# Patient Record
Sex: FEMALE | Race: WHITE | NOT HISPANIC OR LATINO | Employment: UNEMPLOYED | URBAN - METROPOLITAN AREA
[De-identification: names, ages, dates, MRNs, and addresses within clinical notes are randomized per-mention and may not be internally consistent; named-entity substitution may affect disease eponyms.]

---

## 2022-11-09 ENCOUNTER — APPOINTMENT (OUTPATIENT)
Dept: URGENT CARE | Facility: CLINIC | Age: 27
End: 2022-11-09

## 2022-11-09 DIAGNOSIS — Z02.1 PRE-EMPLOYMENT HEALTH SCREENING EXAMINATION: Primary | ICD-10-CM

## 2022-11-09 DIAGNOSIS — Z02.1 PRE-EMPLOYMENT HEALTH SCREENING EXAMINATION: ICD-10-CM

## 2022-11-11 LAB
GAMMA INTERFERON BACKGROUND BLD IA-ACNC: 0.03 IU/ML
M TB IFN-G BLD-IMP: NEGATIVE
M TB IFN-G CD4+ BCKGRND COR BLD-ACNC: 0 IU/ML
M TB IFN-G CD4+ BCKGRND COR BLD-ACNC: 0.01 IU/ML
MITOGEN IGNF BCKGRD COR BLD-ACNC: >10 IU/ML

## 2022-12-12 ENCOUNTER — APPOINTMENT (OUTPATIENT)
Dept: URGENT CARE | Facility: CLINIC | Age: 27
End: 2022-12-12

## 2022-12-12 ENCOUNTER — APPOINTMENT (OUTPATIENT)
Dept: LAB | Facility: CLINIC | Age: 27
End: 2022-12-12

## 2022-12-12 DIAGNOSIS — Z02.1 PRE-EMPLOYMENT EXAMINATION: ICD-10-CM

## 2022-12-12 LAB — RUBV IGG SERPL IA-ACNC: >175 IU/ML

## 2022-12-13 LAB
GAMMA INTERFERON BACKGROUND BLD IA-ACNC: 0.03 IU/ML
M TB IFN-G BLD-IMP: NEGATIVE
M TB IFN-G CD4+ BCKGRND COR BLD-ACNC: 0 IU/ML
M TB IFN-G CD4+ BCKGRND COR BLD-ACNC: 0.01 IU/ML
MEV IGG SER QL IA: NORMAL
MITOGEN IGNF BCKGRD COR BLD-ACNC: >10 IU/ML
MUV IGG SER QL IA: NORMAL
VZV IGG SER QL IA: NORMAL

## 2023-07-06 ENCOUNTER — TELEPHONE (OUTPATIENT)
Dept: NEUROLOGY | Facility: CLINIC | Age: 28
End: 2023-07-06

## 2023-07-06 NOTE — TELEPHONE ENCOUNTER
OM for the patient to call us back to schedule a NP appt with Dr. Shiela Barrera per his request. If the patient calls back please schedule with him directly on either July 18th at 2pm or July 20th at 8am if either time is available. If the patient can not see him during those times we can also offer to set up an appt on July 14th at the Neurosurg office per Shiela Barrera.

## 2023-07-06 NOTE — TELEPHONE ENCOUNTER
Spoke to the patient and scheduled a tentative date and time of 7/14 at [address] [facility] in [address] [place] [address] with Kalie Rodriguez. Spencer Youssef is aware. Awaiting response from Kalie Rodriguez to ensure availability.

## 2023-07-14 ENCOUNTER — TELEPHONE (OUTPATIENT)
Dept: NEUROSURGERY | Facility: CLINIC | Age: 28
End: 2023-07-14

## 2023-07-14 ENCOUNTER — CONSULT (OUTPATIENT)
Dept: NEUROLOGY | Facility: CLINIC | Age: 28
End: 2023-07-14
Payer: COMMERCIAL

## 2023-07-14 VITALS
TEMPERATURE: 98.9 F | BODY MASS INDEX: 31.41 KG/M2 | WEIGHT: 177.3 LBS | RESPIRATION RATE: 18 BRPM | DIASTOLIC BLOOD PRESSURE: 64 MMHG | HEART RATE: 80 BPM | OXYGEN SATURATION: 98 % | SYSTOLIC BLOOD PRESSURE: 110 MMHG | HEIGHT: 63 IN

## 2023-07-14 DIAGNOSIS — G43.709 CHRONIC MIGRAINE WITHOUT AURA: Primary | ICD-10-CM

## 2023-07-14 PROBLEM — G43.009 MIGRAINE WITHOUT AURA AND WITHOUT STATUS MIGRAINOSUS, NOT INTRACTABLE: Status: ACTIVE | Noted: 2023-07-14

## 2023-07-14 PROCEDURE — 99204 OFFICE O/P NEW MOD 45 MIN: CPT | Performed by: PSYCHIATRY & NEUROLOGY

## 2023-07-14 RX ORDER — RIMEGEPANT SULFATE 75 MG/75MG
TABLET, ORALLY DISINTEGRATING ORAL
Qty: 8 TABLET | Refills: 2 | Status: SHIPPED | OUTPATIENT
Start: 2023-07-14

## 2023-07-14 RX ORDER — NORETHINDRONE ACETATE AND ETHINYL ESTRADIOL AND FERROUS FUMARATE 1.5-30(21)
1 KIT ORAL DAILY
COMMUNITY
Start: 2023-06-15

## 2023-07-14 RX ORDER — INDOMETHACIN 25 MG/1
CAPSULE ORAL
COMMUNITY

## 2023-07-14 RX ORDER — ELETRIPTAN HYDROBROMIDE 40 MG/1
TABLET, FILM COATED ORAL EVERY 24 HOURS
COMMUNITY
End: 2023-07-14

## 2023-07-14 RX ORDER — CYPROHEPTADINE HYDROCHLORIDE 4 MG/1
TABLET ORAL
Qty: 60 TABLET | Refills: 3 | Status: SHIPPED | OUTPATIENT
Start: 2023-07-14

## 2023-07-14 RX ORDER — METOCLOPRAMIDE 10 MG/1
TABLET ORAL
Qty: 20 TABLET | Refills: 3 | Status: SHIPPED | OUTPATIENT
Start: 2023-07-14

## 2023-07-14 NOTE — PROGRESS NOTES
Assessment/Plan:   Patient Instructions   Chronic migraine: Nataly Cohn presents for an initial consultation with regard to what qualifies as chronic migraine without aura. She is also experiencing episodes of vertigo. Her examination in the office today is quite reassuring. I did review her prior MRI from 2012 which was quite normal.  -As an initial attempt at prevention we will begin cyproheptadine 4 mg tablets. She should take 1 tablet at bedtime (whether bedtime is day or night depending on her schedule) and if she notices no benefit and no side effects at all after 2 weeks she could increase to 2 tablets at a time  -If she notices that she is too tired when she tries to wake up she should take the cyproheptadine earlier before bedtime  -To treat a migraine as it occurs we will begin Nurtec 75 mg tablets. She should take 1 tablet as early as possible to treat the migraine. That could be combined with an NSAID (either 1 tablet of indomethacin or 3 tablets of over-the-counter ibuprofen), 1 tablet of Reglan, or both  -I would like for her to keep track of her migraines using an application on her phone  -She should get adequate rest whenever possible. It is important that she remain well-hydrated drinking at least 48 to 64 ounces of noncaffeinated beverages per day in addition to any caffeine. She should also continue to get regular exercise whenever possible. All of these measures can help to reduce migraine frequency  -If her vertigo episodes do not significantly improve/resolve with better control of her migraines then we will consider additional testing  -Up to this point she has used 1 dose of indomethacin prior to significant physical exercise and has not had any exercise triggered migraines. Is not unreasonable to continue that practice but at this point in time I am not 100% sure that the indomethacin is necessarily helping.   Once the migraines are under good control consideration could be given to a trial of holding the indomethacin  -Depending on the results of cyproheptadine we could consider long-acting topiramate as our next option    She should reach out to me in no more than 2 weeks to report on her progress and I will see her directly back in the office in 3 months time. No problem-specific Assessment & Plan notes found for this encounter. Subjective:     Kristofer West is a 33 y.o. Female who presents for evaluation of headache. Temporal Pattern of Headaches: They started having HA's  ago at 14 y/o in 2012    Additional Relevant History:  History of severe head/neck trauma? yes - concussions, no LOC  History of head/neck surgery? no  Do you have any family history of headache? migraine headaches in mother  Family history of aneurysms? no  Are you now or currently planning to become pregnant? No, on OCP       Is there any particular time of day that is worse: morning if weather related, at work latera in the day  Do they awaken from sleep?: no  Is there any seasonal pattern?: fall and spring are worse  Any relationship to menstrual cycle: no  'Clustering' of HA's over time? no  Have you found any triggers? Weather, hospital lights, stress, jaw clenching at night, takes Indocin befiore running and not getting migraines  Exacerbating factors if any: laying in a dark room with head elevated a little    Description of Headaches:  Do you have a specific aura? No  Are there any other prodromal sx?: None    What is the typical location of pain: bilateral superior frontal and temporal areas, R>L, also perioribital pain on the right    What is the character of pain: Pounding    How severe is the pain for a typical headache? 7  What is the maximum severity of pain?  9    Accompanying symptoms:nausea, vomiting, photophobia, some lacrimation,     Rapidity of onset: gradual but starts at a 5    Typical duration of individual headache:  Approx 36 hours    Frequency of headaches 15 migraine days per month, more than 3 months      Are most headaches similar in presentation? yes        Therapeutics:  Preventives: Topamax (side effects but helped), Mg/B2 not helpful    Abortives: Sumatriptan (tabs/injection, injections was quite painful, helped), Maxalt (no help), Relpax (makes her feel worse), Indocin (prior to activity, also uses as an abortives and it helps a little)      Dizziness with bending down and getting up quickly, notes that when she wakes up in the morning if she feels more dizzy she will be more sensitive that day. Feels both light headed and room spinning. Such bad days are happening 4 days per month. At this point suspect may be migraine sensitivity phenominon but will determine change with migraine treatment. Review of Systems    Review of Systems   Constitutional: Negative. HENT: Negative. Eyes: Positive for pain (RIGHT EYE AND TEMPORAL PAIN WITH HEADACHES STABBING). Respiratory: Negative. Cardiovascular: Negative. Gastrointestinal: Negative. Endocrine: Negative. Genitourinary: Negative. Musculoskeletal: Negative for gait problem. Skin: Negative. Neurological: Positive for dizziness (OCASSIONALLY FEELS LIKE A HEAD RUSH DOESNT LAST LONG), light-headedness (OCASSIONALY WITH AND WITHOUT HEADACHES) and headaches (ABOUT 15 DAYS OUT OF THE MONTH  LAST A FEW DAYS  CONSTANT  PRESSURE WHOLE HEAD AND STABBING PAIN IN RIGHT EYE TEMPORAL AREA). Negative for tremors, seizures, syncope (WHEN DIZZY OR LIGHTHEADED FEELS AS IF SHE WILL PASS OUT), speech difficulty, weakness and numbness. Hematological: Does not bruise/bleed easily. Psychiatric/Behavioral: Negative for confusion and sleep disturbance.      Objective:      /64 (BP Location: Right arm, Patient Position: Sitting, Cuff Size: Standard)   Pulse 80   Temp 98.9 °F (37.2 °C) (Temporal)   Resp 18   Ht 5' 3" (1.6 m)   Wt 80.4 kg (177 lb 4.8 oz)   SpO2 98%   BMI 31.41 kg/m²          Physical Exam      At the time my examination the patient was awake, alert, and in no distress. They are an excellent historian. There is no dysarthria or aphasia. Cranial nerves 2-12 were symmetrically intact bilaterally. Funduscopic exam did not reveal any papilledema. Motor testing reveals 5/5 strength throughout the bilateral upper lower extremities. There is no drift. Finger-to-nose with eyes closed is intact indicating relatively preserved proprioceptive function. With eyes open there is no dysmetria or ataxia. Sensation is intact to temperature and vibration throughout the bilateral upper lower extremities. Deep tendon reflexes were symmetric. Their gait was stable. HINTS exam was essentially normal      I have spent a total time of 48 minutes on 7/14/23 in caring for this patient including Diagnostic results, Prognosis, Risks and benefits of tx options, Instructions for management, Patient and family education, Impressions, Counseling / Coordination of care, Documenting in the medical record, Reviewing / ordering tests, medicine, procedures   and Obtaining or reviewing history  .

## 2023-07-14 NOTE — PATIENT INSTRUCTIONS
Chronic migraine: Dax Case presents for an initial consultation with regard to what qualifies as chronic migraine without aura. She is also experiencing episodes of vertigo. Her examination in the office today is quite reassuring. I did review her prior MRI from 2012 which was quite normal.  -As an initial attempt at prevention we will begin cyproheptadine 4 mg tablets. She should take 1 tablet at bedtime (whether bedtime is day or night depending on her schedule) and if she notices no benefit and no side effects at all after 2 weeks she could increase to 2 tablets at a time  -If she notices that she is too tired when she tries to wake up she should take the cyproheptadine earlier before bedtime  -To treat a migraine as it occurs we will begin Nurtec 75 mg tablets. She should take 1 tablet as early as possible to treat the migraine. That could be combined with an NSAID (either 1 tablet of indomethacin or 3 tablets of over-the-counter ibuprofen), 1 tablet of Reglan, or both  -I would like for her to keep track of her migraines using an application on her phone  -She should get adequate rest whenever possible. It is important that she remain well-hydrated drinking at least 48 to 64 ounces of noncaffeinated beverages per day in addition to any caffeine. She should also continue to get regular exercise whenever possible. All of these measures can help to reduce migraine frequency  -If her vertigo episodes do not significantly improve/resolve with better control of her migraines then we will consider additional testing  -Up to this point she has used 1 dose of indomethacin prior to significant physical exercise and has not had any exercise triggered migraines. Is not unreasonable to continue that practice but at this point in time I am not 100% sure that the indomethacin is necessarily helping.   Once the migraines are under good control consideration could be given to a trial of holding the indomethacin  -Depending on the results of cyproheptadine we could consider long-acting topiramate as our next option    She should reach out to me in no more than 2 weeks to report on her progress and I will see her directly back in the office in 3 months time.

## 2023-08-09 ENCOUNTER — DOCUMENTATION (OUTPATIENT)
Dept: OTHER | Facility: HOSPITAL | Age: 28
End: 2023-08-09

## 2023-08-09 DIAGNOSIS — G43.009 MIGRAINE WITHOUT AURA AND WITHOUT STATUS MIGRAINOSUS, NOT INTRACTABLE: Primary | ICD-10-CM

## 2023-08-09 DIAGNOSIS — G43.709 CHRONIC MIGRAINE WITHOUT AURA: ICD-10-CM

## 2023-08-09 RX ORDER — CYPROHEPTADINE HYDROCHLORIDE 4 MG/1
TABLET ORAL
Qty: 180 TABLET | Refills: 2 | Status: SHIPPED | OUTPATIENT
Start: 2023-08-09 | End: 2023-08-09

## 2023-08-09 RX ORDER — TOPIRAMATE 25 MG/1
CAPSULE, EXTENDED RELEASE ORAL
Qty: 60 CAPSULE | Refills: 1 | Status: SHIPPED | OUTPATIENT
Start: 2023-08-09

## 2023-08-17 ENCOUNTER — APPOINTMENT (OUTPATIENT)
Dept: LAB | Facility: HOSPITAL | Age: 28
End: 2023-08-17

## 2023-08-17 DIAGNOSIS — Z00.8 HEALTH EXAMINATION IN POPULATION SURVEY: ICD-10-CM

## 2023-08-17 LAB
CHOLEST SERPL-MCNC: 177 MG/DL
HDLC SERPL-MCNC: 50 MG/DL
LDLC SERPL CALC-MCNC: 114 MG/DL (ref 0–100)
NONHDLC SERPL-MCNC: 127 MG/DL
TRIGL SERPL-MCNC: 67 MG/DL

## 2023-08-17 PROCEDURE — 36415 COLL VENOUS BLD VENIPUNCTURE: CPT

## 2023-08-17 PROCEDURE — 83036 HEMOGLOBIN GLYCOSYLATED A1C: CPT

## 2023-08-17 PROCEDURE — 80061 LIPID PANEL: CPT

## 2023-08-18 LAB
EST. AVERAGE GLUCOSE BLD GHB EST-MCNC: 105 MG/DL
HBA1C MFR BLD: 5.3 %

## 2023-09-09 ENCOUNTER — APPOINTMENT (OUTPATIENT)
Dept: RADIOLOGY | Facility: CLINIC | Age: 28
End: 2023-09-09
Payer: COMMERCIAL

## 2023-09-09 ENCOUNTER — OFFICE VISIT (OUTPATIENT)
Dept: URGENT CARE | Facility: CLINIC | Age: 28
End: 2023-09-09
Payer: COMMERCIAL

## 2023-09-09 VITALS
OXYGEN SATURATION: 99 % | DIASTOLIC BLOOD PRESSURE: 79 MMHG | RESPIRATION RATE: 18 BRPM | TEMPERATURE: 97.5 F | SYSTOLIC BLOOD PRESSURE: 134 MMHG

## 2023-09-09 DIAGNOSIS — M25.532 LEFT WRIST PAIN: ICD-10-CM

## 2023-09-09 DIAGNOSIS — S63.502A WRIST SPRAIN, LEFT, INITIAL ENCOUNTER: Primary | ICD-10-CM

## 2023-09-09 PROCEDURE — 73110 X-RAY EXAM OF WRIST: CPT

## 2023-09-09 PROCEDURE — 99213 OFFICE O/P EST LOW 20 MIN: CPT

## 2023-09-09 NOTE — LETTER
September 9, 2023     Patient: Vela Ahumada   YOB: 1995   Date of Visit: 9/9/2023       To Whom it May Concern:    Vela Ahumada was seen in my clinic on 9/9/2023. She may return to work on 9/9/2023 . Please allow for lighter duty due to wrist sprain. If you have any questions or concerns, please don't hesitate to call.          Sincerely,          Normajean Bumpers, CRNP        CC: No Recipients

## 2023-09-09 NOTE — PROGRESS NOTES
[facility]        NAME: Kike Fuentes is a 33 y.o. female  : 1995    MRN: [id number]  DATE: 2023  TIME: 8:29 AM    Assessment and Plan   Wrist sprain, left, initial encounter [S63.502A]  1. Wrist sprain, left, initial encounter  Ambulatory Referral to Orthopedic Surgery      2. Left wrist pain  XR wrist 3+ vw left          Prefabricated wrist brace fit and placed by RN. Patient Instructions     Your x-rays were read by the provider. A radiologist will also read the x-rays and you will be notified of any abnormalities. Continue ice applications 86-01 min 2-3 times daily over the next 2-3 days. Continue Tylenol or Motrin for pain relief. Follow-up with ortho - referral placed. Follow-up with your PCP as needed. Go to the ED for any severely worsening symptoms. Chief Complaint     Chief Complaint   Patient presents with   • Left Wrist Pain      Pt fell yesterday on left wrist and has had pain since event. History of Present Illness       This is a 27-year-old female who presents with left wrist pain and swelling after falling onto tile floor yesterday with her wrist flexed. Patient states she heard a pop. Now feels a cracking sensation whenever she rotates her wrist.  Reports occasional numbness in her left hand, no tingling or weakness. Applying ice and taking Tylenol for pain relief. Review of Systems   Review of Systems   Constitutional: Negative for chills and fever. Respiratory: Negative for chest tightness and shortness of breath. Cardiovascular: Negative for chest pain and palpitations. Musculoskeletal: Positive for arthralgias (left wrist). Negative for myalgias. Skin: Negative for rash and wound. Neurological: Negative for dizziness, light-headedness and headaches.        Current Medications       Current Outpatient Medications:   •  Jayshree JAMES  1.5-30 MG-MCG tablet, Take 1 tablet by mouth daily, Disp: , Rfl:   • indomethacin (INDOCIN) 25 mg capsule, TAKE 1 CAPSULE PRIOR TO ACTIVITIES for 30, Disp: , Rfl:   •  metoclopramide (REGLAN) 10 mg tablet, 1 tab as needed at onset of migraine, can repeat in 8 hours, can combine with triptan/NSAID, Disp: 20 tablet, Rfl: 3  •  rimegepant sulfate (Nurtec) 75 mg TBDP, One tab as needed for migraine, max one per day, max 3 days per week, Disp: 8 tablet, Rfl: 2  •  topiramate ER (Qudexy XR) 25 MG capsule, 1 cap daily x 1-2 weeks, if no side effects increase to 2 caps, Disp: 60 capsule, Rfl: 1    Current Allergies     Allergies as of 09/09/2023 - Reviewed 09/09/2023   Allergen Reaction Noted   • Nuts - food allergy Hives 07/14/2023            The following portions of the patient's history were reviewed and updated as appropriate: allergies, current medications, past family history, past medical history, past social history, past surgical history and problem list.     Past Medical History:   Diagnosis Date   • Chronic headaches     FROM [facility]IVITY       Past Surgical History:   Procedure Laterality Date   • CYST REMOVAL     • KNEE SURGERY      LEFT   • NEUROPLASTY / TRANSPOSITION ULNAR NERVE AT ELBOW         Family History   Problem Relation Age of Onset   • Hypertension Mother    • Hypertension Father          Medications have been verified. Objective   /79   Temp 97.5 °F (36.4 °C) (Tympanic)   Resp 18   LMP 08/21/2023 (Approximate) Comment: Pt reports LMP was normal.  SpO2 99%        Physical Exam     Physical Exam  Vitals and nursing note reviewed. Constitutional:       General: She is not in acute distress. HENT:      Head: Normocephalic. Mouth/Throat:      Mouth: Mucous membranes are moist.   Cardiovascular:      Rate and Rhythm: Normal rate and regular rhythm. Pulses: Normal pulses. Heart sounds: Normal heart sounds. Pulmonary:      Effort: Pulmonary effort is normal.      Breath sounds: Normal breath sounds.    Musculoskeletal:         General: Normal range of motion. Left forearm: Normal.      Left wrist: Swelling (mild, no ecchymosis), tenderness and crepitus present. No snuff box tenderness. Normal pulse (+2 radial). Left hand: Normal. Normal strength. Normal sensation. Cervical back: Normal range of motion and neck supple. Comments: Pain with resisted flexion/extension. Skin:     General: Skin is warm and dry. Capillary Refill: Capillary refill takes less than 2 seconds. Neurological:      Mental Status: She is alert and oriented to person, place, and time.

## 2023-09-19 ENCOUNTER — OFFICE VISIT (OUTPATIENT)
Dept: OBGYN CLINIC | Facility: CLINIC | Age: 28
End: 2023-09-19
Payer: COMMERCIAL

## 2023-09-19 VITALS
HEART RATE: 80 BPM | DIASTOLIC BLOOD PRESSURE: 85 MMHG | SYSTOLIC BLOOD PRESSURE: 121 MMHG | BODY MASS INDEX: 31.71 KG/M2 | HEIGHT: 63 IN | WEIGHT: 179 LBS

## 2023-09-19 DIAGNOSIS — M25.532 LEFT WRIST PAIN: Primary | ICD-10-CM

## 2023-09-19 DIAGNOSIS — S63.502A WRIST SPRAIN, LEFT, INITIAL ENCOUNTER: ICD-10-CM

## 2023-09-19 DIAGNOSIS — M24.532 CONTRACTURE, LEFT WRIST: ICD-10-CM

## 2023-09-19 PROCEDURE — 99203 OFFICE O/P NEW LOW 30 MIN: CPT | Performed by: PHYSICIAN ASSISTANT

## 2023-09-19 NOTE — PATIENT INSTRUCTIONS
P. R.I.C.E. Treatment   WHAT YOU NEED TO KNOW:   What is P.R.I.C.E. treatment? P.R.I.C.E. treatment is a 5-step process used to decrease swelling and pain caused by an injury. P.R.I.C.E. stands for protect, rest, ice, compress, and elevate. Start P.R.I.C.E. within 24 to 48 hours of an injury. How do I use P.R.I.C.E. treatment? Protect  your injury from more damage. Support the injured area with a brace or splint. Your healthcare provider will tell you how long to use the brace or splint. Rest  your injured area as directed. You may need to stop using, or keep weight off, the injury for 48 hours or longer. Your healthcare provider may recommend crutches or another device. Return to your usual activities as directed. Apply ice  on your injured area for 15 to 20 minutes every 4 hours or as directed. Use an ice pack, or put crushed ice in a plastic bag. Cover the bag with a towel before you apply it to your skin. Ice helps prevent tissue damage and decreases swelling and pain. Compress  (keep pressure on) the injured area. Compression will help decrease swelling and support the injured area. Use an elastic bandage, air stirrup, splint, or sling as directed. If you use an elastic bandage, make sure the bandage is not too tight. You should be able to slip 2 fingers between the bandage and your skin. Elevate  the injured area above the level of your heart as often as you can. This will help decrease swelling and pain. Prop the injured area on pillows or blankets to keep it elevated comfortably. When should I seek immediate care? Your pain is severe. You have severe swelling or deformity. You have numbness in the injured area. When should I call my doctor? Your pain and swelling do not go away after a few days. You have questions or concerns about your condition or care. CARE AGREEMENT:   You have the right to help plan your care.  Learn about your health condition and how it may be treated. Discuss treatment options with your healthcare providers to decide what care you want to receive. You always have the right to refuse treatment. The above information is an  only. It is not intended as medical advice for individual conditions or treatments. Talk to your doctor, nurse or pharmacist before following any medical regimen to see if it is safe and effective for you. © Copyright Alexandra Friedman 2022 Information is for End User's use only and may not be sold, redistributed or otherwise used for commercial purposes. Wrist Sprain   WHAT YOU NEED TO KNOW:   What is a wrist sprain? A wrist sprain happens when one or more ligaments in your wrist stretch or tear. Ligaments are tough tissues that connect bones and keep them in place, and support your joints. What are the signs and symptoms of a wrist sprain? Swelling and tenderness    Pain and stiffness    Bruising or changes in skin color    Popping sound in your wrist when you move it    How is a wrist sprain diagnosed? Your healthcare provider will ask how you injured your wrist. The provider will examine your wrist and hand and ask about your symptoms. You may need x-rays, an MRI, or a CT scan of your wrist. You may be given contrast liquid to help the pictures show up better. Tell the healthcare provider if you have ever had an allergic reaction to contrast liquid. Do not enter the MRI room with anything metal. Metal can cause serious injury. Tell the healthcare provider if you have any metal in or on your body. How is a wrist sprain treated? Treatment depends on how severe your sprain is. You may need any of the following:  NSAIDs , such as ibuprofen, help decrease swelling, pain, and fever. NSAIDs can cause stomach bleeding or kidney problems in certain people. If you take blood thinner medicine, always ask your healthcare provider if NSAIDs are safe for you. Always read the medicine label and follow directions.     Acetaminophen decreases pain and fever. It is available without a doctor's order. Ask how much to take and how often to take it. Follow directions. Read the labels of all other medicines you are using to see if they also contain acetaminophen, or ask your doctor or pharmacist. Acetaminophen can cause liver damage if not taken correctly. A splint or cast  helps support your wrist and prevent more damage. Surgery  may be needed if you have a severe sprain. Arthroscopy may be done to examine the inside of your wrist joint and repair ligament damage. Arthroscopy uses a scope that is inserted through a small incision. You may need open surgery to reconnect torn ligaments to the bone. Physical therapy  may be recommended. A physical therapist teaches you exercises to help improve movement and strength, and to decrease pain. How can I manage my symptoms? Rest  your wrist for at least 48 hours. Avoid activities that cause pain. Ice  your wrist for 15 to 20 minutes every hour or as directed. Use an ice pack, or put crushed ice in a plastic bag. Cover it with a towel before you put it on your wrist. Ice helps prevent tissue damage and decreases swelling and pain. Compress  your wrist with an elastic bandage. This will help decrease swelling, support your wrist, and help it heal. Wear your wrist wrap as directed. The elastic bandage should be snug but not tight. Elevate  your wrist above the level of your heart as often as you can. This will help decrease swelling and pain. Prop your wrist on pillows or blankets to keep it elevated comfortably. When should I seek immediate care? You have severe pain or swelling. Your injured wrist is red or has red streaks spreading from the injured area. You have new trouble moving your hands, fingers, or wrist.    Your wrist, hand, or fingers feel cold or numb. Your fingernails turn blue or gray. When should I call my doctor?    Your symptoms get worse.    Your sprain does not get better within 2 weeks. You have questions or concerns about your condition or care. CARE AGREEMENT:   You have the right to help plan your care. Learn about your health condition and how it may be treated. Discuss treatment options with your healthcare providers to decide what care you want to receive. You always have the right to refuse treatment. The above information is an  only. It is not intended as medical advice for individual conditions or treatments. Talk to your doctor, nurse or pharmacist before following any medical regimen to see if it is safe and effective for you. © Copyright Sae Vega 2022 Information is for End User's use only and may not be sold, redistributed or otherwise used for commercial purposes.

## 2023-09-19 NOTE — PROGRESS NOTES
Orthopaedic Surgery - Office Note  Blue Cooley (52 y.o. female)   : 1995   MRN: [id number]  Encounter Date: 2023    No chief complaint on file. 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 Shared decision-making was utilized regarding neck steps. At this time due to the inability to supinate the wrist with moderate pain I would recommend an MRI to rule out pathology. In the interim she will be started in occupational therapy to begin range of motion and strengthening exercises. She may ice the wrist for comfort 20 minutes on 1 hour off 3 times a day. She may continue the brace for comfort and support. She may elevate the wrist above the level of the heart for edema control. Return for Recheck with hand surgeon to discuss MRI of left wrist.        History of Present Illness  This is a new patient who injured her left wrist on 2023 when she fell on a tile floor. Her wrist was flexed at the time and she reports her entire body weight fell onto the wrist.  She reported hearing a pop and noticed cracking sensations when she went to urgent care on 2023. She was given a brace at urgent care and advised to ice and utilize over-the-counter NSAIDs and Tylenol for pain. Patient reports the wrist did not swell and did not become ecchymotic. She reports continued dorsal wrist pain and the inability to supinate the wrist.  She states she has torn a tendon on her small finger in the remote past and it never bled or swelled. No paresthesias are reported. She is a [employer] employee. Review of Systems  Pertinent items are noted in HPI. All other systems were reviewed and are negative. Physical Exam  /85   Pulse 80   Ht 5' 3" (1.6 m)   Wt 81.2 kg (179 lb)   LMP 08/21/2023 (Approximate) Comment: Pt reports LMP was normal.  BMI 31.71 kg/m²   Cons: Appears well. No apparent distress. Psych: Alert. Oriented x3. Mood and affect normal.  Patient's left wrist is tender diffusely through the dorsum including the distal radius and distal ulna. She is able to actively flex and extend all digits. She is able to actively flex and extend the wrist with limited range of motion and pain to wrist extension. She has no ability to actively supinate the left wrist.  Attempts to passively supinate the wrist resulted in significant patient pain and guarding. She has no TFCC tenderness. Distal radial ulnar pulses are +2. There is no gross deformity, ecchymosis, or soft tissue edema.  strength is 4 out of 5. She has no anatomical snuffbox tenderness. Studies Reviewed  Study Result    Narrative & Impression   LEFT WRIST     INDICATION:   M25.532: Pain in left wrist.     COMPARISON:  None     VIEWS:  XR WRIST 3+ VW LEFT        FINDINGS:     There is no acute fracture or dislocation.     No significant degenerative changes.     No lytic or blastic osseous lesion.     Soft tissues are unremarkable.     IMPRESSION:     No acute osseous abnormality.     Workstation performed: [id number]     X-ray images as well as reports were reviewed by myself in the office today and I agree with radiologist interpretation.   Urgent care notes from 9/9/2023 were reviewed by myself in the office today. Procedures  No procedures today. Medical, Surgical, Family, and Social History  The patient's medical history, family history, and social history, were reviewed and updated as appropriate.     Past Medical History:   Diagnosis Date   • Chronic headaches     FROM ACTIVITY       Past Surgical History:   Procedure Laterality Date   • CYST REMOVAL     • KNEE SURGERY      LEFT   • NEUROPLASTY / TRANSPOSITION ULNAR NERVE AT ELBOW         Family History   Problem Relation Age of Onset   • Hypertension Mother    • Hypertension Father        Social History     Occupational History   • Not on file   Tobacco Use   • Smoking status: Never   • Smokeless tobacco: Never   Substance and Sexual Activity   • Alcohol use: Yes     Comment: SOCIALLY   • Drug use: Never   • Sexual activity: Not on file       Allergies   Allergen Reactions   • Nuts - Food Allergy Hives         Current Outpatient Medications:   •  Jayshree FE 1.5/30 1.5-30 MG-MCG tablet, Take 1 tablet by mouth daily, Disp: , Rfl:   •  indomethacin (INDOCIN) 25 mg capsule, TAKE 1 CAPSULE PRIOR TO ACTIVITIES for 30, Disp: , Rfl:   •  metoclopramide (REGLAN) 10 mg tablet, 1 tab as needed at onset of migraine, can repeat in 8 hours, can combine with triptan/NSAID, Disp: 20 tablet, Rfl: 3  •  rimegepant sulfate (Nurtec) 75 mg TBDP, One tab as needed for migraine, max one per day, max 3 days per week, Disp: 8 tablet, Rfl: 2  •  topiramate ER (Qudexy XR) 25 MG capsule, 1 cap daily x 1-2 weeks, if no side effects increase to 2 caps, Disp: 60 capsule, Rfl: 1      Brandyn Thomason PA-C

## 2023-10-03 ENCOUNTER — TELEPHONE (OUTPATIENT)
Dept: NEUROLOGY | Facility: CLINIC | Age: 28
End: 2023-10-03

## 2023-10-04 NOTE — TELEPHONE ENCOUNTER
Hello,     Patient has called back. I saw some holds for r/s in November but due marco being early she declined as she work for [employer] and needs later afternoons. She accepted [facility] slot on 01/04/20224, [facility], DR. Elna Lesches, 3 pm and placed on the waiting list.    Thank you,     Colby Herbert

## 2023-10-05 ENCOUNTER — HOSPITAL ENCOUNTER (OUTPATIENT)
Dept: MRI IMAGING | Facility: HOSPITAL | Age: 28
End: 2023-10-05
Payer: COMMERCIAL

## 2023-10-05 DIAGNOSIS — M24.532 CONTRACTURE, LEFT WRIST: ICD-10-CM

## 2023-10-05 DIAGNOSIS — M25.532 LEFT WRIST PAIN: ICD-10-CM

## 2023-10-05 DIAGNOSIS — S63.502A WRIST SPRAIN, LEFT, INITIAL ENCOUNTER: ICD-10-CM

## 2023-10-05 PROCEDURE — 73221 MRI JOINT UPR EXTREM W/O DYE: CPT

## 2023-10-05 PROCEDURE — G1004 CDSM NDSC: HCPCS

## 2023-10-09 ENCOUNTER — APPOINTMENT (OUTPATIENT)
Dept: RADIOLOGY | Facility: CLINIC | Age: 28
End: 2023-10-09
Payer: COMMERCIAL

## 2023-10-09 ENCOUNTER — OFFICE VISIT (OUTPATIENT)
Dept: OBGYN CLINIC | Facility: CLINIC | Age: 28
End: 2023-10-09
Payer: COMMERCIAL

## 2023-10-09 VITALS
WEIGHT: 179 LBS | SYSTOLIC BLOOD PRESSURE: 142 MMHG | BODY MASS INDEX: 31.71 KG/M2 | DIASTOLIC BLOOD PRESSURE: 88 MMHG | HEIGHT: 63 IN

## 2023-10-09 DIAGNOSIS — S66.902A INJURY OF LEFT SCAPHOLUNATE LIGAMENT WITH NO INSTABILITY, INITIAL ENCOUNTER: Primary | ICD-10-CM

## 2023-10-09 DIAGNOSIS — S63.502A WRIST SPRAIN, LEFT, INITIAL ENCOUNTER: ICD-10-CM

## 2023-10-09 DIAGNOSIS — M25.532 LEFT WRIST PAIN: ICD-10-CM

## 2023-10-09 PROCEDURE — 99204 OFFICE O/P NEW MOD 45 MIN: CPT | Performed by: ORTHOPAEDIC SURGERY

## 2023-10-09 PROCEDURE — 73080 X-RAY EXAM OF ELBOW: CPT

## 2023-10-09 NOTE — PROGRESS NOTES
ASSESSMENT/PLAN:    Assessment:   Scapholunate injury to the left wrist    Plan:   Discussed treatment options including continued observation, activity modification, bracing, anti-inflammatories, hand therapy, cortisone injection, versus surgical intervention for wrist arthroscopy. She wishes to proceed with surgical intervention for left wrist arthroscopy with IV sedation and regional block. No narcotics postop. Reviewed risks and postoperative expectations. Written consent was obtained. Follow Up: After Surgery    To Do Next Visit:  Sutures out    Operative Discussions:  Standard Consent: The risks and benefits of the procedure were explained to the patient, which include, but are not limited to: Bleeding, infection, recurrence, pain, scar, damage to tendons, damage to nerves, and damage to blood vessels, failure to give desired results and complications related to anesthesia. These risks, along with alternative conservative treatment options, and postoperative protocols were voiced back and understood by the patient. All questions were answered to the patient's satisfaction. The patient agrees to comply with a standard postoperative protocol, and is willing to proceed. Education was provided via written and auditory forms. There were no barriers to learning. Written handouts regarding wound care, incision and scar care, and general preoperative information was provided to the patient. Prior to surgery, the patient may be requested to stop all anti-inflammatory medications. Prophylactic aspirin, Plavix, and Coumadin may be allowed to be continued. Medications including vitamin E., ginkgo, and fish oil are requested to be stopped approximately one week prior to surgery. Hypertensive medications and beta blockers, if taken, should be continued.     _____________________________________________________  CHIEF COMPLAINT:  Chief Complaint   Patient presents with   • Left Wrist - Pain     Referred by Justyn Prado PA-C MRI 10/5/23          SUBJECTIVE:  Manuel Grewal is a RHD 33 y.o. female who presents with left wrist pain due to a fall onto a flexed wrist on a tile floor on 9/8/2023. At the time of the injury she did hear a pop. She was referred to us by Justyn Prado PA-C. She is here today to review the results of her left wrist MRI. She continues to have ulnar-sided left wrist pain. She describes sharp pain and cracking with flexion and extension of the left wrist.  She has some limitations with her motion due to pain. She describes her pain as aching at rest.  She has been wearing a volar wrist brace with some relief. She denies any numbness or tingling. 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     PAST MEDICAL HISTORY:  Past Medical History:   Diagnosis Date   • Chronic headaches     FROM ACTIVITY       PAST SURGICAL HISTORY:  Past Surgical History:   Procedure Laterality Date   • CYST REMOVAL     • KNEE SURGERY      LEFT   • NEUROPLASTY / TRANSPOSITION ULNAR NERVE AT ELBOW         FAMILY HISTORY:  Family History   Problem Relation Age of Onset   • Hypertension Mother    • Hypertension Father        SOCIAL HISTORY:  Social History     Tobacco Use   • Smoking status: Never   • Smokeless tobacco: Never   Substance Use Topics   • Alcohol use: Yes     Comment: SOCIALLY   • Drug use: Never       MEDICATIONS:    Current Outpatient Medications:   •  Jayshree JAMES 1.5/30 1.5-30 MG-MCG tablet, Take 1 tablet by mouth daily, Disp: , Rfl:   •  indomethacin (INDOCIN) 25 mg capsule, TAKE 1 CAPSULE PRIOR TO ACTIVITIES for 30, Disp: , Rfl:   •  metoclopramide (REGLAN) 10 mg tablet, 1 tab as needed at onset of migraine, can repeat in 8 hours, can combine with triptan/NSAID, Disp: 20 tablet, Rfl: 3  •  rimegepant sulfate (Nurtec) 75 mg TBDP, One tab as needed for migraine, max one per day, max 3 days per week, Disp: 8 tablet, Rfl: 2  •  topiramate ER (Qudexy XR) 25 MG capsule, 1 cap daily x 1-2 weeks, if no side effects increase to 2 caps, Disp: 60 capsule, Rfl: 1    ALLERGIES:  Allergies   Allergen Reactions   • Nuts - Food Allergy Hives       REVIEW OF SYSTEMS:  Pertinent items are noted in HPI. A comprehensive review of systems was negative. LABS:  HgA1c:   Lab Results   Component Value Date    HGBA1C 5.3 08/17/2023     BMP: No results found for: "GLUCOSE", "CALCIUM", "NA", "K", "CO2", "CL", "BUN", "CREATININE"      _____________________________________________________  PHYSICAL EXAMINATION:  Vital signs: /88   Ht 5' 3" (1.6 m)   Wt 81.2 kg (179 lb)   LMP 08/21/2023 (Approximate) Comment: Pt reports LMP was normal.  BMI 31.71 kg/m²   General: well developed and well nourished, alert, oriented times 3 and appears comfortable  Psychiatric: Normal  HEENT: Trachea Midline, No torticollis  Cardiovascular: No discernable arrhythmia  Pulmonary: No wheezing or stridor  Abdomen: No rebound or guarding  Extremities: No peripheral edema  Skin: No masses, erythema, lacerations, fluctation, ulcerations  Neurovascular: Sensation Intact to the Median, Ulnar, Radial Nerve, Motor Intact to the Median, Ulnar, Radial Nerve and Pulses Intact    MUSCULOSKELETAL EXAMINATION:  Left wrist: no swelling, click over 4th extensor compartment, tenderness over SL ligament complex, pain with SL shuck test, pain over LT ligament, negative LT shuck, minimal tenderness over ECU, tender over DRUJ, DRUJ is stable, rossi click present, full pronation and supination, TFCC circumduction negative, carpal stress negative.     _____________________________________________________  STUDIES REVIEWED:  Images were reviewed in PACS by Dr. Marty Mayfield and demonstrate:   X-ray left wrist 9/9/2023 was reviewed and shows no acute osseous abnormalities. MRI of the left wrist 10/5/2023 was reviewed and shows mild ECU tendinitis. X-ray left elbow 10/9/2023 were reviewed and shows no acute osseous abnormalities.       PROCEDURES PERFORMED:  Procedures  No Procedures performed today    Scribe Attestation    I,:  Greg Reardon am acting as a scribe while in the presence of the attending physician.:       I,:  Evelio Bradley MD personally performed the services described in this documentation    as scribed in my presence.:

## 2023-10-10 ENCOUNTER — PREP FOR PROCEDURE (OUTPATIENT)
Dept: OBGYN CLINIC | Facility: CLINIC | Age: 28
End: 2023-10-10

## 2024-01-16 ENCOUNTER — TELEPHONE (OUTPATIENT)
Age: 29
End: 2024-01-16

## 2024-01-16 NOTE — TELEPHONE ENCOUNTER
Caller: Patient     Doctor: Kirti     Reason for call: Patient would like to cancel upcoming surgery

## 2024-06-30 DIAGNOSIS — Z00.6 ENCOUNTER FOR EXAMINATION FOR NORMAL COMPARISON OR CONTROL IN CLINICAL RESEARCH PROGRAM: ICD-10-CM

## 2025-02-16 ENCOUNTER — APPOINTMENT (EMERGENCY)
Dept: CT IMAGING | Facility: HOSPITAL | Age: 30
End: 2025-02-16
Payer: COMMERCIAL

## 2025-02-16 ENCOUNTER — HOSPITAL ENCOUNTER (EMERGENCY)
Facility: HOSPITAL | Age: 30
Discharge: HOME | End: 2025-02-16
Attending: EMERGENCY MEDICINE | Admitting: EMERGENCY MEDICINE
Payer: COMMERCIAL

## 2025-02-16 VITALS
WEIGHT: 170 LBS | BODY MASS INDEX: 30.12 KG/M2 | TEMPERATURE: 98.4 F | DIASTOLIC BLOOD PRESSURE: 99 MMHG | RESPIRATION RATE: 16 BRPM | HEIGHT: 63 IN | SYSTOLIC BLOOD PRESSURE: 146 MMHG | HEART RATE: 96 BPM | OXYGEN SATURATION: 100 %

## 2025-02-16 DIAGNOSIS — S00.83XA FACIAL CONTUSION, INITIAL ENCOUNTER: Primary | ICD-10-CM

## 2025-02-16 DIAGNOSIS — R51.9 FACIAL PAIN, ACUTE: ICD-10-CM

## 2025-02-16 PROCEDURE — 99284 EMERGENCY DEPT VISIT MOD MDM: CPT

## 2025-02-16 PROCEDURE — 70486 CT MAXILLOFACIAL W/O DYE: CPT

## 2025-02-16 NOTE — ED NOTES
"Er staff attempt to get a pregnancy test on pt. Pt reported, \" I already went to CT and I am not pregnant\" Provider notified.      Anna Caldera RN  02/16/25 1817    "

## 2025-02-16 NOTE — ED PROVIDER NOTES
Time reflects when diagnosis was documented in both MDM as applicable and the Disposition within this note       Time User Action Codes Description Comment    2/16/2025  8:47 PM Helen Alegria Add [S00.83XA] Facial contusion, initial encounter     2/16/2025  8:47 PM Helen Alegria Add [R51.9] Facial pain, acute           ED Disposition       ED Disposition   Discharge    Condition   Stable    Date/Time   Sun Feb 16, 2025  8:47 PM    Comment   Janice James discharge to home/self care.                   Assessment & Plan       Medical Decision Making  The patient reports a history of blunt facial trauma from her dog striking her face yesterday.  The differential diagnosis includes, but is not limited to, soft tissue contusion, minor soft tissue swelling, nasal bone bruise, nasal bone fracture, etc. Will obtain CT of the facial bones.    On examination, there is mild localized tenderness over the nasal bones without obvious step-offs, palpable deformities, septal hematoma, significant swelling, crepitus, or external deformity. Imaging of the maxillofacial structures shows no evidence of an acute fracture. Supportive care with ice packs, NSAIDs for pain, etc was discussed. Follow up with primary care provider advised and patient verbalized understanding and agreement to plan.     Amount and/or Complexity of Data Reviewed  Labs: ordered.  Radiology: ordered.        ED Course as of 02/17/25 1551   Sun Feb 16, 2025   1923 Called the radiologist, reports patient's scan is 15th in line     2047 No evidence of acute maxillofacial fracture.       Medications - No data to display    ED Risk Strat Scores                            SBIRT 22yo+      Flowsheet Row Most Recent Value   Initial Alcohol Screen: US AUDIT-C     1. How often do you have a drink containing alcohol? 0 Filed at: 02/16/2025 1800   2. How many drinks containing alcohol do you have on a typical day you are drinking?  0 Filed at: 02/16/2025 1800   3a. Male UNDER  65: How often do you have five or more drinks on one occasion? 0 Filed at: 02/16/2025 1800   3b. FEMALE Any Age, or MALE 65+: How often do you have 4 or more drinks on one occassion? 0 Filed at: 02/16/2025 1800   Audit-C Score 0 Filed at: 02/16/2025 1800   GIANLUCA: How many times in the past year have you...    Used an illegal drug or used a prescription medication for non-medical reasons? Never Filed at: 02/16/2025 1800                            History of Present Illness       Chief Complaint   Patient presents with    Facial Injury     Pt reports dog bumped her nose and wants to make sure its not broken. 3/10 pain       Past Medical History:   Diagnosis Date    Chronic headaches     FROM ACTIVITY      Past Surgical History:   Procedure Laterality Date    CYST REMOVAL      KNEE SURGERY      LEFT    NEUROPLASTY / TRANSPOSITION ULNAR NERVE AT ELBOW        Family History   Problem Relation Age of Onset    Hypertension Mother     Hypertension Father       Social History     Tobacco Use    Smoking status: Never    Smokeless tobacco: Never   Substance Use Topics    Alcohol use: Yes     Comment: SOCIALLY    Drug use: Never      E-Cigarette/Vaping      E-Cigarette/Vaping Substances      I have reviewed and agree with the history as documented.     The patient is a 29-year-old female presenting to the emergency department for evaluation of facial pain following a recent trauma. She reports that yesterday her dog accidentally struck her face as it entered the car. She reports over the course of the day she developed discomfort and feeling of fullness especially of the nose and above the upper lip. She denies any loss of consciousness, visual disturbances or other symptoms. The patient is particular concerned about the possibility of a nasal fracture.          Review of Systems   Constitutional:  Negative for chills and fever.   HENT:  Positive for facial swelling (subjective). Negative for congestion, ear pain, nosebleeds,  rhinorrhea and sore throat.         Facial pain   Eyes:  Negative for photophobia, pain and visual disturbance.   Musculoskeletal:  Positive for arthralgias (nose). Negative for back pain, neck pain and neck stiffness.   Skin:  Negative for color change and rash.   Neurological:  Negative for dizziness, tremors, seizures, syncope, weakness, light-headedness, numbness and headaches.   All other systems reviewed and are negative.          Objective       ED Triage Vitals [02/16/25 1739]   Temperature Pulse Blood Pressure Respirations SpO2 Patient Position - Orthostatic VS   98.4 °F (36.9 °C) 96 146/99 16 100 % Sitting      Temp Source Heart Rate Source BP Location FiO2 (%) Pain Score    Temporal Monitor Right arm -- 3      Vitals      Date and Time Temp Pulse SpO2 Resp BP Pain Score FACES Pain Rating User   02/16/25 1800 -- -- -- -- -- 3 -- LK   02/16/25 1739 98.4 °F (36.9 °C) 96 100 % 16 146/99 3 -- CM            Physical Exam  Vitals and nursing note reviewed.   Constitutional:       General: She is not in acute distress.     Appearance: Normal appearance. She is well-developed. She is not ill-appearing.   HENT:      Head: Normocephalic and atraumatic.      Nose: Nose normal.      Comments: No significant swelling noted over the facial structures. There is localized tenderness on palpation of the nasal bones without obvious step-offs or palpable deformities. The nasal septum appears midline with no septal hematoma. No obvious deformity or crepitus is detected. No active epistaxis, periorbital ecchymosis. The patient is breathing comfortably without signs of airway compromise.     Mouth/Throat:      Comments: No evidence of malocclusion or instability of the jaw.     Eyes:      Extraocular Movements: Extraocular movements intact.      Conjunctiva/sclera: Conjunctivae normal.   Pulmonary:      Effort: Pulmonary effort is normal.   Musculoskeletal:         General: No swelling. Normal range of motion.      Cervical  back: Normal range of motion and neck supple. No rigidity or tenderness.   Skin:     General: Skin is warm and dry.      Capillary Refill: Capillary refill takes less than 2 seconds.   Neurological:      Mental Status: She is alert and oriented to person, place, and time. Mental status is at baseline.   Psychiatric:         Mood and Affect: Mood normal.         Results Reviewed       None            CT facial bones without contrast   Final Interpretation by Sergio Cruz MD (2017)      No evidence of acute maxillofacial fracture.               Workstation performed: [id number]             Procedures    ED Medication and Procedure Management   Prior to Admission Medications   Prescriptions Last Dose Informant Patient Reported? Taking?   [facility]  1.5-30 MG-MCG tablet   Yes No   Sig: Take 1 tablet by mouth daily   indomethacin (INDOCIN) 25 mg capsule   Yes No   Sig: TAKE 1 CAPSULE PRIOR TO ACTIVITIES for 30   metoclopramide (REGLAN) 10 mg tablet   No No   Si tab as needed at onset of migraine, can repeat in 8 hours, can combine with triptan/NSAID   rimegepant sulfate (Nurtec) 75 mg TBDP   No No   Sig: One tab as needed for migraine, max one per day, max 3 days per week   topiramate ER (Qudexy XR) 25 MG capsule   No No   Si cap daily x 1-2 weeks, if no side effects increase to 2 caps      Facility-Administered Medications: None     Discharge Medication List as of 2025  8:48 PM        CONTINUE these medications which have NOT CHANGED    Details   [facility]  1.5-30 MG-MCG tablet Take 1 tablet by mouth daily, Starting u 6/15/2023, Historical Med      indomethacin (INDOCIN) 25 mg capsule TAKE 1 CAPSULE PRIOR TO ACTIVITIES for 30, Historical Med      metoclopramide (REGLAN) 10 mg tablet 1 tab as needed at onset of migraine, can repeat in 8 hours, can combine with triptan/NSAID, Normal      rimegepant sulfate (Nurtec) 75 mg TBDP One tab as needed for migraine, max one per day, max 3  days per week, Normal      topiramate ER (Qudexy XR) 25 MG capsule 1 cap daily x 1-2 weeks, if no side effects increase to 2 caps, Normal           No discharge procedures on file.  ED SEPSIS DOCUMENTATION   Time reflects when diagnosis was documented in both MDM as applicable and the Disposition within this note       Time User Action Codes Description Comment    2/16/2025  8:47 PM Helen Alegria [S00.83XA] Facial contusion, initial encounter     2/16/2025  8:47 PM Helen Alegria [R51.9] Facial pain, acute                  Helen Alegria PA-C  02/17/25 1718

## 2025-02-17 NOTE — DISCHARGE INSTRUCTIONS
Please follow-up with your primary care provider.  Please return to the emergency department if you are experiencing any new or worsening symptoms.

## 2025-04-14 ENCOUNTER — OFFICE VISIT (OUTPATIENT)
Dept: OBGYN CLINIC | Facility: CLINIC | Age: 30
End: 2025-04-14
Payer: COMMERCIAL

## 2025-04-14 VITALS
HEIGHT: 63 IN | WEIGHT: 176 LBS | BODY MASS INDEX: 31.18 KG/M2 | SYSTOLIC BLOOD PRESSURE: 120 MMHG | DIASTOLIC BLOOD PRESSURE: 82 MMHG

## 2025-04-14 DIAGNOSIS — Z78.9 USES BIRTH CONTROL: ICD-10-CM

## 2025-04-14 DIAGNOSIS — Z01.419 WELL WOMAN EXAM: Primary | ICD-10-CM

## 2025-04-14 PROCEDURE — S0610 ANNUAL GYNECOLOGICAL EXAMINA: HCPCS | Performed by: OBSTETRICS & GYNECOLOGY

## 2025-04-14 RX ORDER — NORETHINDRONE ACETATE AND ETHINYL ESTRADIOL .03; 1.5 MG/1; MG/1
TABLET ORAL
COMMUNITY

## 2025-04-14 RX ORDER — NORETHINDRONE ACETATE AND ETHINYL ESTRADIOL 1.5-30(21)
1 KIT ORAL DAILY
Qty: 28 TABLET | Refills: 11 | Status: SHIPPED | OUTPATIENT
Start: 2025-04-14

## 2025-04-14 NOTE — PROGRESS NOTES
[facility] - [address], [address], [place] [address]    ASSESSMENT/PLAN: Janice James is a 29 y.o.  who presents for annual gynecologic exam.    Encounter for routine gynecologic examination  - Routine well woman exam completed today.  - Cervical Cancer Screening: Current ASCCP Guidelines reviewed. Last Pap: Not on file  at [facility]. History of abnormal: denies  - HPV Vaccination status: Immunization series complete  - STI screening offered including HIV testing: offered, pt declined  - Contraceptive counseling discussed.  Current contraception: oral contraceptives (estrogen/progesterone):   - The following were reviewed in today's visit: breast self exam, adequate intake of calcium and vitamin D, exercise, and healthy diet    Additional problems addressed during this visit:  1. Well woman exam  2. Uses birth control  -     norethindrone-ethinyl estradiol-iron (Junel FE 1.5/30) 1.5-30 MG-MCG tablet; Take 1 tablet by mouth daily      CC:  Annual Gynecologic Examination    HPI: Janice James is a 29 y.o.  who presents for annual gynecologic examination. She reports a light menses, no pain, monthly. She reports no complaints and is doing well.     ROS: Negative except as noted in HPI    Patient's last menstrual period was 2025.       She  reports being sexually active. She reports using the following methods of birth control/protection: Condom and OCP.       The following portions of the patient's history were reviewed and updated as appropriate:   Past Medical History:   Diagnosis Date    Chronic headaches     FROM ACTIVITY    Migraine      Past Surgical History:   Procedure Laterality Date    CYST REMOVAL      ELBOW SURGERY      Ulnar nerve decompression    KNEE SURGERY      LEFT    NEUROPLASTY / TRANSPOSITION ULNAR NERVE AT ELBOW       Family History   Problem Relation Age of Onset    Hypertension Mother     Hypertension Father      Social History     Socioeconomic  "History    Marital status: Single     Spouse name: None    Number of children: None    Years of education: None    Highest education level: None   Occupational History    None   Tobacco Use    Smoking status: Never    Smokeless tobacco: Never   Substance and Sexual Activity    Alcohol use: Yes     Alcohol/week: 1.0 standard drink of alcohol     Types: 1 Standard drinks or equivalent per week     Comment: Social    Drug use: Never    Sexual activity: Yes     Birth control/protection: Condom, OCP   Other Topics Concern    None   Social History Narrative    None     Social Drivers of Health     Financial Resource Strain: Not on file   Food Insecurity: Not on file   Transportation Needs: Not on file   Physical Activity: Not on file   Stress: Not on file   Social Connections: Not on file   Intimate Partner Violence: Not on file   Housing Stability: Not on file     Outpatient Medications Marked as Taking for the 4/14/25 encounter (Office Visit) with Jennifer MOTT DO   Medication    indomethacin (INDOCIN) 25 mg capsule    metoclopramide (REGLAN) 10 mg tablet    Norethindrone Acet-Ethinyl Est (Junel 1.5/30) 1.5-30 MG-MCG TABS    norethindrone-ethinyl estradiol-iron (Junel FE 1.5/30) 1.5-30 MG-MCG tablet    rimegepant sulfate (Nurtec) 75 mg TBDP     Allergies   Allergen Reactions    Nuts - Food Allergy Hives           Objective:  /82 (BP Location: Right arm, Patient Position: Sitting, Cuff Size: Standard)   Ht 5' 3\" (1.6 m)   Wt 79.8 kg (176 lb)   LMP 04/14/2025   BMI 31.18 kg/m²        General Appearance: alert and oriented, in no acute distress.   Respiratory: Unlabored breathing.  Abdomen: Soft, non-tender, non-distended, no masses, no rebound or guarding.  Breast Exam: No dimpling, nipple retraction or discharge. No lumps or masses. No axillary or supraclavicular nodes.   Pelvic:   External genitalia: Normal appearance, no abnormal pigmentation, no lesions or masses. Normal Bartholin's and [place]'s.      " Urinary system: Urethral meatus normal,       Bladder non-tender.      Vaginal: normal mucosa without prolapse or lesions. Normal-appearing physiologic discharge.      Cervix: Normal-appearing, well-epithelialized, no gross lesions or masses. No cervical motion tenderness.      Adnexa: No adnexal masses or tenderness noted.      Uterus: Normal-sized, regular contour, midline, mobile, no uterine tenderness.  Extremities: Normal range of motion. Warm, well-perfused, non-tender.   Skin: normal, no rash or abnormalities  Neurologic: alert, oriented x3  Psychiatric: Appropriate affect, mood stable, cooperative with exam.        Jennifer Rea DO  4/14/2025 1:08 PM